# Patient Record
Sex: FEMALE | Race: WHITE | NOT HISPANIC OR LATINO | Employment: UNEMPLOYED | URBAN - METROPOLITAN AREA
[De-identification: names, ages, dates, MRNs, and addresses within clinical notes are randomized per-mention and may not be internally consistent; named-entity substitution may affect disease eponyms.]

---

## 2018-02-21 ENCOUNTER — OFFICE VISIT (OUTPATIENT)
Dept: OPTOMETRY | Facility: CLINIC | Age: 28
End: 2018-02-21
Payer: OTHER GOVERNMENT

## 2018-02-21 DIAGNOSIS — H53.2 DIPLOPIA: ICD-10-CM

## 2018-02-21 DIAGNOSIS — Z01.00 ROUTINE EYE EXAM: Primary | ICD-10-CM

## 2018-02-21 DIAGNOSIS — H52.13 MYOPIA OF BOTH EYES: ICD-10-CM

## 2018-02-21 PROCEDURE — 92004 COMPRE OPH EXAM NEW PT 1/>: CPT | Mod: S$PBB,,, | Performed by: OPTOMETRIST

## 2018-02-21 PROCEDURE — 99212 OFFICE O/P EST SF 10 MIN: CPT | Mod: PBBFAC | Performed by: OPTOMETRIST

## 2018-02-21 PROCEDURE — 99999 PR PBB SHADOW E&M-EST. PATIENT-LVL II: CPT | Mod: PBBFAC,,, | Performed by: OPTOMETRIST

## 2018-02-21 NOTE — PROGRESS NOTES
"HPI     Patient's last dilated exam was: 2012    Pt here for routine eye exam. C/o blurred vision when driving at night.   Has to hold her phone very close to see her phone. Has been causing her to   have frequent headaches. Floaters OU seeing them more frequently in the   last 6 months. She will see an increase in numbers and followed by a   migraine. Feels her "eyes are not focusing together, they're working too   hard". Not using any gtts.    Diplopia: horizontal, random but once it starts during the day it happens   several times, started to become more frequent and a problem 5 months ago,   closing one eye makes it go away, Distance>Near, FHx of strabismus mother  Always had headaches but has increased in frequency    Last edited by Magy Wood, OD on 2/21/2018  2:09 PM. (History)            Assessment /Plan     For exam results, see Encounter Report.    Routine eye exam    Myopia of both eyes    Diplopia        1-3.  Patient states horizontal diplopia started about 5 months ago.  Is random but once is starts will happen several times during the day.  Does resolve when closing one eye.  Distance>Near.  No rx given today.  Overall eye health normal.  Refer to Dr. Mcnulty for binocular vision evaluation.                   "

## 2018-03-02 ENCOUNTER — INITIAL CONSULT (OUTPATIENT)
Dept: OPTOMETRY | Facility: CLINIC | Age: 28
End: 2018-03-02
Payer: OTHER GOVERNMENT

## 2018-03-02 DIAGNOSIS — H50.30 ESOTROPIA, INTERMITTENT: Primary | ICD-10-CM

## 2018-03-02 DIAGNOSIS — H50.21 HYPERTROPIA OF RIGHT EYE: ICD-10-CM

## 2018-03-02 DIAGNOSIS — H52.13 MYOPIA OF BOTH EYES: ICD-10-CM

## 2018-03-02 PROCEDURE — 99999 PR PBB SHADOW E&M-EST. PATIENT-LVL II: CPT | Mod: PBBFAC,,, | Performed by: OPTOMETRIST

## 2018-03-02 PROCEDURE — 99212 OFFICE O/P EST SF 10 MIN: CPT | Mod: PBBFAC | Performed by: OPTOMETRIST

## 2018-03-02 PROCEDURE — 92310 CONTACT LENS FITTING OU: CPT | Mod: ,,, | Performed by: OPTOMETRIST

## 2018-03-02 NOTE — PATIENT INSTRUCTIONS
Strabismus (Crossed Eyes)    Crossed eyes, or strabismus as it is medically termed, is a condition in which both eyes do not look at the same place at the same time. It occurs when an eye turns in, out, up or down and is usually caused by poor eye muscle control or a high amount of farsightedness.  There are six muscles attached to each eye that control how it moves. The muscles receive signals from the brain that direct their movements. Normally, the eyes work together so they both point at the same place. When problems develop with eye movement control, an eye may turn in, out, up or down. The eye turning may be evident all the time or may appear only at certain times such as when the person is tired, ill, or has done a lot of reading or close work. In some cases, the same eye may turn each time, while in other cases, the eyes may alternate turning.  Maintaining proper eye alignment is important to avoid seeing double, for good depth perception, and to prevent the development of poor vision in the turned eye. When the eyes are misaligned, the brain receives two different images. At first, this may create double vision and confusion, but over time the brain will learn to ignore the image from the turned eye. If the eye turning becomes constant and is not treated, it can lead to permanent reduction of vision in one eye, a condition called amblyopia or lazy eye.  Some babies eyes may appear to be misaligned, but are actually both aiming at the same object. This is a condition called pseudostrabismus or false strabismus. The appearance of crossed eyes may be due to extra skin that covers the inner corner of the eyes, or a wide bridge of the nose. Usually, this will change as the childs face begins to grow.   Strabismus usually develops in infants and young children, most often by age 3, but older children and adults can also develop the condition. There is a common misconception that a child with strabismus will  outgrow the condition. However, this is not true. In fact, strabismus may get worse without treatment. Any child older than four months whose eyes do not appear to be straight all the time should be examined.  Strabismus is classified by the direction the eye turns:  Inward turning is called esotropia   Outward turning is called exotropia   Upward turning is called hypertropia   Downward turning is called hypotropia.   Other classifications of strabismus include:  The frequency with which it occurs - either constant or intermittent   Whether it always involves the same eye - unilateral   If the turning eye is sometimes the right eye and other times the left eye - alternating.  Treatment for strabismus may include eyeglasses, prisms, vision therapy, or eye muscle surgery. If detected and treated early, strabismus can often be corrected with excellent results.                    Strabismus can be caused by problems with the eye muscles, the nerves that transmit information to the muscles, or the control center in the brain that directs eye movements. It can also develop due to other general health conditions or eye injuries.  Risk factors for developing strabismus include:  Family history - individuals with parents or siblings who have strabismus are more likely to develop it.   Refractive error - people who have a significant amount of uncorrected farsightedness (hyperopia) may develop strabismus because of the additional amount of eye focusing required to keep objects clear.   Medical conditions - people with conditions such as Down syndrome and cerebral palsy or who have suffered a stroke or head injury are at a higher risk for developing strabismus.  Although there are many types of strabismus that can develop in children or adults, the two most common forms are accommodative esotropia and intermittent exotropia.  Accommodative esotropia often occurs because of uncorrected farsightedness (hyperopia). Because the  eyes focusing system is linked to the system that controls where the eyes point, the extra focusing effort needed to keep images clear in farsightedness may cause the eyes to turn inward. Signs and symptoms of accommodative esotropia may include seeing double, closing or covering one eye when doing close work, and tilting or turning of the head.   Intermittent exotropia may develop due to an inability to coordinate both eyes together. The eyes may have a tendency to point beyond the object being viewed. People with intermittent exotropia may experience headaches, difficulty reading, and eye strain. They also may have a tendency to close one eye when viewing at distance or in bright sunlight.       How is strabismus treated?  People with strabismus have several treatment options available to improve eye alignment and coordination. They include:   eyeglasses or contact lenses   prism lenses   vision therapy   eye muscle surgery  Eyeglasses or contact lenses may be prescribed for patients with uncorrected farsightedness. This may be the only treatment needed for some patients with accommodative esotropia. Once the farsightedness is corrected, the eyes require less focusing effort and may remain straight.  Prism lenses are special lenses that have a prescription for prism power in them. The prisms alter the light entering the eye and assist in reducing the amount of turning the eye has to do to look at objects. Sometimes the prisms are able to fully compensate for and eliminate the eye turning.  Vision therapy is a structured program of visual activities prescribed to improve eye coordination and eye focusing abilities. Vision therapy trains the eyes and brain to work together more effectively. These eye exercises help remediate deficiencies in eye movement, eye focusing and eye teaming and reinforce the eye-brain connection. Treatment may include office-based as well as home training procedures.  Eye muscle surgery  can change the length or position of the muscles around the eye in an attempt to better align the eyes. Eye muscle surgery may be able to physically align the eyes so they appear straight. Often a program of vision therapy may also be needed to develop a functional improvement in eye coordination and to keep the eyes from reverting back to their previous condition of misalignment.    Courtesy of [employer]    School-aged Vision:     A child needs many abilities to succeed in school. Good vision is a key. It has been estimated that as much as 80% of the learning a child does occurs through his or her eyes. Reading, writing, chalkboard work, and using computers are among the visual tasks students perform daily. A child's eyes are constantly in use in the classroom and at play. When his or her vision is not functioning properly, education and participation in sports can suffer.      As children progress in school, they face increasing demands on their visual abilities.   The school years are a very important time in every child's life. All parents want to see their children do well in school and most parents do all they can to provide them with the best educational opportunities. But too often one important learning tool may be overlooked - a child's vision.  As children progress in school, they face increasing demands on their visual abilities. The size of print in schoolbooks becomes smaller and the amount of time spent reading and studying increases significantly. Increased class work and homework place significant demands on the child's eyes. Unfortunately, the visual abilities of some students aren't performing up to the task.  When certain visual skills have not developed, or are poorly developed, learning is difficult and stressful, and children will typically:  Avoid reading and other near visual work as much as possible.   Attempt to do the work anyway, but with a lowered level of  "comprehension or efficiency.   Experience discomfort, fatigue and a short attention span.  Some children with learning difficulties exhibit specific behaviors of hyperactivity and distractibility. These children are often labeled as having "Attention Deficit Hyperactivity Disorder" (ADHD). However, undetected and untreated vision problems can elicit some of the very same signs and symptoms commonly attributed to ADHD. Due to these similarities, some children may be mislabeled as having ADHD when, in fact, they have an undetected vision problem.  Because vision may change frequently during the school years, regular eye and vision care is important. The most common vision problem is nearsightedness or myopia. However, some children have other forms of refractive error like farsightedness and astigmatism. In addition, the existence of eye focusing, eye tracking and eye coordination problems may affect school and sports performance.  Eyeglasses or contact lenses may provide the needed correction for many vision problems. However, a program of vision therapy may also be needed to help develop or enhance vision skills.    Vision Skills Needed For School Success      There are many visual skills beyond seeing clearly that team together to support academic success.   Vision is more than just the ability to see clearly, or having 20/20 eyesight. It is also the ability to understand and respond to what is seen. Basic visual skills include the ability to focus the eyes, use both eyes together as a team, and move them effectively. Other visual perceptual skills include:  recognition (the ability to tell the difference between letters like "b" and "d"),   comprehension (to "picture" in our mind what is happening in a story we are reading), and   retention (to be able to remember and recall details of what we read).  Every child needs to have the following vision skills for effective reading and learning:  Visual acuity -- the " ability to see clearly in the distance for viewing the chalkboard, at an intermediate distance for the computer, and up close for reading a book.    Eye Focusing -- the ability to quickly and accurately maintain clear vision as the distance from objects change, such as when looking from the chalkboard to a paper on the desk and back. Eye focusing allows the child to easily maintain clear vision over time like when reading a book or writing a report.    Eye tracking -- the ability to keep the eyes on target when looking from one object to another, moving the eyes along a printed page, or following a moving object like a thrown ball.    Eye teaming -- the ability to coordinate and use both eyes together when moving the eyes along a printed page, and to be able to  distances and see depth for class work and sports.    Eye-hand coordination -- the ability to use visual information to monitor and direct the hands when drawing a picture or trying to hit a ball.    Visual perception -- the ability to organize images on a printed page into letters, words and ideas and to understand and remember what is read.  If any of these visual skills are lacking or not functioning properly, a child will have to work harder. This can lead to headaches, fatigue and other eyestrain problems. Parents and teachers need to be alert for symptoms that may indicate a child has a vision problem.      Signs of Eye and Vision Problems  A child may not tell you that he or she has a vision problem because they may think the way they see is the way everyone sees.  Signs that may indicate a child has vision problem include:  Frequent eye rubbing or blinking   Short attention span   Avoiding reading and other close activities   Frequent headaches   Covering one eye   Tilting the head to one side   Holding reading materials close to the face   An eye turning in or out   Seeing double   Losing place when reading   Difficulty remembering what he or  she reads    When is a Vision Exam Needed?      Your child should receive an eye examination at least once every two years-more frequently if specific problems or risk factors exist, or if recommended by your eye doctor.   Unfortunately, parents and educators often incorrectly assume that if a child passes a school screening, then there is no vision problem. However, many school vision screenings only test for distance visual acuity. A child who can see 20/20 can still have a vision problem. In reality, the vision skills needed for successful reading and learning are much more complex.  Even if a child passes a vision screening, they should receive a comprehensive optometric examination if:  They show any of the signs or symptoms of a vision problem listed above.   They are not achieving up to their potential.   They are minimally able to achieve, but have to use excessive time and effort to do so.  Vision changes can occur without your child or you noticing them. Therefore, your child should receive an eye examination at least once every two years-more frequently if specific problems or risk factors exist, or if recommended by your eye doctor. The earlier a vision problem is detected and treated, the more likely treatment will be successful. When needed, the doctor can prescribe treatment including eyeglasses, contact lenses or vision therapy to correct any vision problems.      Sports Vision and Eye Protection  Outdoor games and sports are an enjoyable and important part of most children's lives. Whether playing catch in the back yard or participating in team sports at school, vision plays an important role in how well a child performs.  Specific visual skills needed for sports include:  Clear distance vision   Good depth perception   Wide field of vision   Effective eye-hand coordination  A child who consistently underperforms a certain skill in a sport, such as always hitting the front of the rim in basketball  or swinging late at a pitched ball in baseball, may have a vision problem. If visual skills are not adequate, the child may continue to perform poorly. Correction of vision problems with eyeglasses or contact lenses, or a program of eye exercises called vision therapy can correct many vision problems, enhance vision skills, and improve sports vision performance. (Link to Sports Vision)  Eye protection should also be a major concern to all student athletes, especially in certain high-risk sports. Thousands of children suffer sports-related eye injuries each year and nearly all can be prevented by using the proper protective eyewear. That is why it is essential that all children wear appropriate, protective eyewear whenever playing sports. Eye protection should also be worn for other risky activities such as lawn mowing and trimming.  Regular prescription eyeglasses or contact lenses are not a substitute for appropriate, well-fitted protective eyewear. Athletes need to use sports eyewear that is tailored to protect the eyes while playing the specific sport. Your doctor of optometry can recommend specific sports eyewear to provide the level of protection needed.   It is also important for all children to protect their eyes from damage caused by ultraviolet radiation in sunlight. Sunglasses are needed to protect the eyes outdoors and some sport-specific designs may even help improve sports performance.      Learning-Related Vision Problems    By Malcolm Alejo, with updates and review by Ramakrishna Andrea, OD    Vision and learning are intimately related. In fact, experts say that roughly 80 percent of what a child learns in school is information that is presented visually. So good vision is essential for students of all ages to reach their full academic potential.  When children have difficulty in school -- from learning to read to understanding fractions to seeing the blackboard -- many parents and teachers believe these kids  "have vision problems.  And sometimes, they're right. Eyeglasses or contact lenses often help children better see the board in the front of the classroom and the books on their desk.  Ruling out simple refractive errors is the first step in making sure your child is visually ready for school. But nearsightedness, farsightedness and astigmatism are not the only visual disorders that can make learning more difficult.  Less obvious vision problems related to the way the eyes function and how the brain processes visual information also can limit your child's ability to learn.  Any vision problems that have the potential to affect academic and reading performance are considered learning-related vision problems.    Vision and Learning Disabilities  Learning-related vision problems are not learning disabilities. The [place] Individuals with Disabilities Education Act (IDEA)* defines a specific learning disability as: ". . . a disorder in one or more of the basic psychological processes involved in understanding or in using language, spoken or written, that may manifest itself in an imperfect ability to listen, think, speak, read, write, spell, or do mathematical calculations, including conditions such as perceptual disabilities, brain injury, minimal brain dysfunction, dyslexia, and developmental aphasia."  IDEA also says learning disabilities do not include learning problems that are primarily due to visual, hearing or motor disabilities. Mental retardation and emotional disturbances also are excluded as learning disabilities, along with learning problems related to environmental, cultural or economic disadvantage.  But specific vision problems can contribute to a child's learning problems, whether or not he has been diagnosed as "learning disabled." In other words, a child struggling in school may have a specific learning disability, a learning-related vision problem, or both.  If you are concerned about your child's " performance in school, you need to find out the underlying cause (or causes) of the problem. The best way to do this is through a team approach that may include the child's teachers, the school psychologist, an eye doctor who specializes in children's vision and learning-related vision problems and perhaps other professionals.  Identifying all contributing causes of the learning problem increases the chances that the problem can be successfully treated.    Types of Learning-Related Vision Problems  Vision is a complex process that involves not only the eyes but the brain as well. Specific learning-related vision problems can be classified as one of three types. The first two types primarily affect visual input. The third primarily affects visual processing and integration.    If your child habitually places her head close to her book when reading, she may have a vision problem that can affect her ability to learn.     Eye health and refractive problems. These problems can affect the visual acuity in each eye as measured by an eye chart. Refractive errors include nearsightedness, farsightedness and astigmatism, but also include more subtle optical errors called higher-order aberrations. Eye health problems can cause low vision -- permanently decreased visual acuity that cannot be corrected by conventional eyeglasses, contact lenses or refractive surgery.    Functional vision problems. Functional vision refers to a variety of specific functions of the eye and the neurological control of these functions, such as eye teaming (binocularity), fine eye movements (important for efficient reading), and accommodation (focusing amplitude, accuracy and flexibility). Deficits of functional visual skills can cause blurred or double vision, eye strain and headaches that can affect learning. Convergence insufficiency is a specific type of functional vision problem that affects the ability of the two eyes to stay accurately and  comfortably aligned during reading.    Perceptual vision problems. Visual perception includes understanding what you see, identifying it, judging its importance and relating it to previously stored information in the brain. This means, for example, recognizing words that you have seen previously, and using the eyes and brain to form a mental picture of the words you see.  Most routine eye exams evaluate only the first of these categories of vision problems -- those related to eye health and refractive errors. However, many optometrists who specialize in children's vision problems and vision therapy offer exams to evaluate functional vision problems and perceptual vision problems that may affect learning.  Color blindness, though typically not considered a learning-related vision problem, may cause problems in school for young children with color vision problems if color-matching or identifying specific colors is required in classroom activities. For this reason, all children should have an eye exam that includes a color blind test prior to starting school.    Symptoms of Learning-Related Vision Problems  Symptoms of learning-related vision problems include:  Headaches or eye strain   Blurred vision or double vision   Crossed eyes or eyes that appear to move independently of each other (Read more about strabismus.)   Dislike or avoidance of reading and close work   Short attention span during visual tasks   Turning or tilting the head to use one eye only, or closing or covering one eye   Placing the head very close to the book or desk when reading or writing   Excessive blinking or rubbing the eyes   Losing place while reading, or using a finger as a guide   Slow reading speed or poor reading comprehension   Difficulty remembering what was read   Omitting or repeating words, or confusing similar words   Persistent reversal of words or letters (after second grade)   Difficulty remembering, identifying or reproducing  shapes   Poor eye-hand coordination   Evidence of developmental immaturity    Learning problems can lead to depression and low self-esteem. Seeing an eye doctor should be one of your first steps.   If your child shows one or more of these symptoms and is experiencing learning problems, it's possible he or she may have a learning-related vision problem.  To determine if such a problem exists, see an eye doctor who specializes in children's vision and learning-related vision problems for a comprehensive evaluation.  If no vision problem is detected, it's possible your child's symptoms are caused by a non-visual dysfunction, such as dyslexia or a learning disability. See an  for an evaluation to rule out these problems.  Signs of Attention and Developmental Disorders   Many people know attention disorders by the names attention deficit disorder (ADD) or attention deficit/hyperactivity disorder (ADHD). Frequently such children are put on drugs like Ritalin. Occasionally children with attention disorders experience other problems that contribute to inattentiveness, such as a speech and language dysfunction or nonverbal disorder. Consult a pediatric neurologist for a definitive diagnosis.  Parents can easily identify the three components of the autism spectrum disorder: lack of eye contact, inability to relate socially or inappropriate social interaction, and unusual repetitive interests that exclude other activities. Any or all of these early signs should prompt a consultation with your family doctor or pediatrician.    Treatment of Learning-Related Vision Problems  If your child is diagnosed with a learning-related vision problem, treatment generally consists of an individualized and doctor-supervised program of vision therapy. Special eyeglasses also may be prescribed for either full-time wear or for specific tasks such as reading.  If your child is also receiving special education or other  "special services for a learning disability, ask the eye doctor who is supervising your child's vision therapy to contact your child's teacher and other professionals involved in his or her Individualized Education Program (IEP) or other remedial activities.  In some cases, vision therapy and remedial learning activities can be combined, and a cooperative effort to address your child's learning problems may be the best approach.  Also, keep in mind that children with learning difficulties may experience emotional problems as well, such as anxiety, depression and low self-esteem.  Reassure your child that learning problems and learning-related vision problems say nothing about a person's intelligence. Many children with learning difficulties have above-average IQs and simply process information differently than their peers.       Vision:   2 to 5 Years of Age    Every experience a preschooler has is an opportunity for growth and development. They use their vision to guide other learning experiences. From ages 2 to 5, a child will be fine-tuning the visual abilities gained during infancy and developing new ones.   Stacking building blocks, rolling a ball back and forth, coloring, drawing, cutting, or assembling lock-together toys all help improve important visual skills. Preschoolers depend on their vision to learn tasks that will prepare them for school. They are developing the visually-guided eye-hand-body coordination, fine motor skills and visual perceptual abilities necessary to learn to read and write.      Steps taken at this age to help ensure vision is developing normally can provide a child with a good "head start" for school.   Preschoolers are eager to draw and look at pictures. Also, reading to young children is important to help them develop strong visualization skills as they "picture" the story in their minds.  This is also the time when parents need to be alert for the presence of vision " "problems like crossed eyes or lazy eye. These conditions often develop at this age. Crossed eyes or strabismus involves one or both eyes turning inward or outward. Amblyopia, commonly known as lazy eye, is a lack of clear vision in one eye, which can't be fully corrected with eyeglasses. Lazy eye often develops as a result of crossed eyes, but may occur without noticeable signs.   In addition, parents should watch their child for indication of any delays in development, which may signal the presence of a vision problem. Difficulty with recognition of colors, shapes, letters and numbers can occur if there is a vision problem.  The  years are a time for developing the visual abilities that a child will need in school and throughout his or her life. Steps taken during these years to help ensure vision is developing normally can provide a child with a good "head start" for school.        Signs of Eye and Vision Problems  According to the [employer] Association, about 10% of preschoolers have eye or vision problems. However, children this age generally will not voice complaints about their eyes.   Parents should watch for signs that may indicate a vision problem, including:   Sitting close to the TV or holding a book too close   Squinting   Tilting their head   Frequently rubbing their eyes   Short attention span for the child's age   Turning of an eye in or out   Sensitivity to light   Difficulty with eye-hand-body coordination when playing ball or bike riding   Avoiding coloring activities, puzzles and other detailed activities  If you notice any of these signs in your preschooler, arrange for a visit to your doctor of optometry.      Understanding the Difference Between a Vision Screening and a Vision Examination  It is important to know that a vision screening by a child's pediatrician or at his or her  is not the same as a comprehensive eye and vision examination by an optometrist. " Vision screenings are a limited process and can't be used to diagnose an eye or vision problem, but rather may indicate a potential need for further evaluation. They may miss as many as 60% of children with vision problems. Even if a vision screening does not identify a possible vision problem, a child may still have one.  Passing a vision screening can give parents a false sense of security. Many  vision screenings only assess one or two areas of vision. They may not evaluate how well the child can focus his or her eyes or how well the eyes work together. Generally color vision, which is important to the use of color coded learning materials, is not tested.   By age 3, your child should have a thorough optometric eye examination to make sure his or her vision is developing properly and there is no evidence of eye disease. If needed, your doctor of optometry can prescribe treatment, including eyeglasses and/or vision therapy, to correct a vision development problem.  With today's diagnostic equipment and tests, a child does not have to know the alphabet or how to read to have his or her eyes examined. Here are several tips to make your child's optometric examination a positive experience:  1. Make an appointment early in the day. Allow about one hour.   2. Talk about the examination in advance and encourage your child's questions.   3. Explain the examination in terms your child can understand, comparing the E chart to a puzzle and the instruments to tiny flashlights and a kaleidoscope.  Unless your doctor of optometry advises otherwise, your child's next eye examination should be at age 5. By comparing test results of the two examinations, your optometrist can tell how well your child's vision is developing for the next major step into the school years.      What Parents Can Do to Help with  Vision Development      Playing with other children can help developing visual skills.   There are everyday  things that parents can do at home to help their preschooler's vision develop as it should. There are a lot of ways to use playtime activities to help improve different visual skills.  Toys, games and playtime activities help by stimulating the process of vision development. Sometimes, despite all your efforts, your child may still miss a step in vision development. This is why vision examinations at ages 3 and 5 are important to detect and treat these problems before a child begins school.  Here are several things that can be done at home to help your preschooler continue to successfully develop his or her visual skills:  Practice throwing and catching a ball or bean bag   Read aloud to your child and let him or her see what is being read   Provide a chalkboard or finger paints   Encourage play activities requiring hand-eye coordination such as block building and assembling puzzles   Play simple memory games   Provide opportunities to color, cut and paste   Make time for outdoor play including ball games, bike/tricycle riding, swinging and rolling activities   Encourage interaction with other children.    Courtesy of The [employer]    To better understand risks for vision problems,please visit: www.mykidsvision.org    To minimize eyestrain and Lower the risk of becoming near-sighted:   - Limit use of near electronic devices to no more than 20 minutes at a time, no more than 2 hours a day    - No electronic devices before age 2    -Avoid watching screens (TV, devices, etc.)  in complete darkness    - Spend 1-3 hours outdoors daily so that the eyes are exposed to natural light        ADULT VISION  19 to 40 Years of Age    Most adults, aged 19 to 40, enjoy healthy eyes and good vision. The most common eye and vision problems experienced by people in this age group are due to visual stress and eye injuries. By taking proper steps to maintain a healthy lifestyle and protect your eyes from stress and  injury, you can avoid many eye and vision problems.  Good vision is important as you pursue a college degree, begin your career, or perhaps start and raise a family. Here are some things you can do to help maintain healthy eyes and good vision:  Eat Healthy -- As part of a healthful diet, eat five servings of fruits and vegetables each day. Choose foods rich in antioxidants like leafy, green vegetables and fish.     Don't Smoke -- Smoking exposes your eyes to high levels of noxious chemicals and increases the risk for developing age-related macular degeneration (AMD) and cataracts.    Get Regular Exercise -- Exercise improves blood circulation, increases oxygen levels to the eyes and aids in the removal of toxins.    Wear Sunglasses -- Protect your eyes from harmful ultraviolet (UV) rays when outdoors. Choose sunglasses with UVA and UVB protection, to block both forms of ultraviolet rays.     Get Periodic Eye Examinations -- Although vision generally remains stable during these years, some problems may develop without any obvious signs or symptoms. The best way to protect your vision is through regularly scheduled professional eye examinations.  The American [employer] Association recommends that adults aged 19 to 40 receive an eye exam at least every two years. If you are at risk for eye problems due to a family history of eye disease, diabetes, high blood pressure or past vision problems, your doctor of optometry may recommend more frequent exams. In between examinations, if you notice a change in your vision, contact your doctor. Detecting and treating problems early can help maintain good vision for the rest of your life.    Dealing with Visual Stress at School or on the Job  Eyestrain is a common occurrence in today's visually demanding world. A typical college schedule or office workday involves spending long hours reading, working at a desk, or staring at a computer. A poorly designed study or work  environment, with elements such as improper lighting, uncomfortable seating, incorrect viewing angles and improper reading or working distances can add to the visual stress. As the day progresses, the eyes begin to fatigue and eyestrain and discomfort can develop.      A poorly designed study or work environment, with elements such as improper lighting, uncomfortable seating, incorrect viewing angles and improper reading or working distances can add to the visual stress.   The following are several key signs and symptoms of eyestrain:  Sore or tired eyes   Itching or burning sensations in the eyes   Sensitivity to light   Dry or watery eyes   Headaches   Difficulty focusing  Here are some simple steps you can take to minimize eyestrain, particularly during computer work:  Workplace Adjustments  Position the top of your computer monitor below eye level so you look slightly downward when viewing the screen. This will help minimize strain on the eyes and the neck. If you are typing from copy, position the text at the same level as the screen. Adjust the screen brightness so it is most comfortable for you. Avoid glare on the computer screen by adjusting window curtains or blinds, repositioning the monitor, or using a glare reduction filter.    Proper Lighting  Examine the lighting in your work area. Overhead lights can be harsh and often are brighter than necessary. Consider turning some of the lights off for a more comfortable lighting situation. Use an adjustable shaded lamp to provide specific task lighting as needed.    Rest Breaks  Throughout the day, give your eyes a chance to rest. Take several minutes every hour to look away from the computer and allow your eyes to re-adjust. Consider standing up and walking around or doing alternate tasks that do not require extensive near focusing. Blink often to refresh the eyes and use artificial tear solutions, if necessary.    Posture  When seated at a desk, make sure your  feet are flat on the floor. Use a chair that is adjustable and provides adequate support for your back. When working at a computer, your arms should form a 90 degree angle at the elbows and your hands should be tilted up slightly to allow your fingers to travel freely over the keyboard.  Making these simple adjustments to your study or work area can pay big dividends in terms of preventing or reducing eyestrain. If you continue to experience eye-related symptoms, you may have a vision problem requiring treatment. Ask your optometrist.      Ensuring Eye Safety at Work, Home or Play  The National [employer] for Occup[employer] Safety and Health reports about 2,000 [place] workers sustain job-related eye injuries that require medical treatment each day. But more injuries to the eye actually result from use or misuse of products at home rather than on the job. Nearly 60 percent of all product-related eye injuries occur in and around the home, according to [employer].  Any injury to the eye has the potential for causing some vision loss or even blindness. Fortunately, most eye injuries can be prevented with the use of proper eye protection. Prevention involves being aware of the common causes of injury and knowing how to protect your eyes-at home, at work and at play.  At Work      Proper eye protection can help to lessen or prevent serious eye injuries.   Eye injuries occurring at work, whether in a factory, on a construction site, on a farm, or in a laboratory, can result from chemical burns, foreign objects in the eye and cuts and scrapes of the cornea. Common causes of injuries include  splashes with chemicals, grease and oil   burns from steam   ultraviolet or infrared radiation exposure; and flying wood or metal chips  Not all forms of safety eyewear provide the same level of protection from flying objects, chemical splashes or radiation exposure. Be sure to wear the appropriate protection for the type of  eye hazards in your workplace.              At Home  Using common sense can help protect the eyes at home. Following 's instructions and safety warnings will help prevent many household product-related eye injuries.  Wearing eye protection while performing certain household activities can prevent eye injuries. Some activities include:  Cleaning the oven or using other strong household chemicals   Chopping wood or doing woodworking   Using motorized equipment or power tools like lawn trimmers and electric drills   Jump-starting a car battery  Non-prescription safety goggles are sold at many home building stores and hardware stores. If you wear prescription glasses, ask your optometrist to make a recommendation on appropriate safety eyewear for household tasks.  At Play  Sprained ankles, skinned knees, and bruises are common occurrences when participating in sports. Unfortunately, so are injuries to the eye.  Regular eyeglasses and contact lenses do not offer adequate protection from sports-related eye injuries. Special eye protection is needed for basketball, football, hockey, baseball and racket sports. Choose the right goggles or protective eyewear for your sport. Your optometrist can advise you on the appropriate eye protection.      Protecting Your Eyes from the Sun  Even on an overcast day, harmful ultraviolet (UV) rays can damage both the skin and the surface of the eye. Over time, unprotected exposure to the sun can increase the risk of certain types of cataracts and cancers of the eyelids. UV, as well as blue light, has the potential to damage the retina, the light-sensitive lining at the back of the eye, which could lead to significant loss of vision. UV damage is cumulative, so it's never too late to begin protecting your eyes from the sun's harmful rays.  The following tips can help prevent eye damage from exposure to UV radiation:  Wear a wide-brimmed hat or cap. It can block up to half of the  UV radiation, reducing the amount that may enter from above or around sunglasses.   Wear sunglasses any time your eyes are exposed to UV radiation, even on cloudy days and during winter months.   Look for quality sunglasses that offer good protection. Sunglasses should block out 99 to 100 percent of both UVA and UBB radiation and screen out 75 to 90 percent of visible light.    Courtesy of The [employer] Association

## 2018-03-02 NOTE — PROGRESS NOTES
HPI     Giselle Naranjo is a 27 y.o. Female who referred by Dr Wood for vertical   diplopia. Onset was 6 months ago when she first noticed that she has   double vision. This occurs mostly when looking in the distance and is   persistent for up to 25 minutes. Mother had strabismus. Corrective surgery    left with alternating vision. Giselle wore  bifocal lenses as a child to   help switching focus from near to distance vision. She gets headaches   about 5 -7 days a week.Today she would like to find out if her headaches   and the double vision could be related.    (+)blurred vision  (+)Headaches  (+)diplopia  (+)flashes  (--)floaters  (--)pain  (--)Itching  (--)tearing  (--)burning  (--)Dryness  (--) OTC Drops  (--)Photophobia    Last edited by Lenore Mcnulty, OD on 3/2/2018  2:24 PM. (History)        ROS    Assessment /Plan     For exam results, see Encounter Report.    1. 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 Myopia of both eyes  - Spec Rx per final Rx below  Glasses Prescription (3/2/2018)        Sphere Cylinder Add Horz Prism Vert Prism    Right -0.50 Sphere +1.00 1.0 out     Left -0.50 Sphere +1.00 1.0 out 0.5 up    Type:  PAL    Expiration Date:  3/3/2019        3. Accommodative infacility   - Low PAL      Patient education; RTC prn

## 2018-03-02 NOTE — LETTER
March 2, 2018      Magy Wood, OD  [address]  [place] [address]           [facility]  [address]  [place] [address]  Phone: [phone]          Patient: Giselle Naranjo   MR Number: [id number]   YOB: 1990   Date of Visit: 3/2/2018       Dear Dr. Magy Wood:    Thank you for referring Giselle Naranjo to me for evaluation. Attached you will find relevant portions of my assessment and plan of care.    If you have questions, please do not hesitate to call me. I look forward to following Giselle Naranjo along with you.    Sincerely,    Lenore Mcnulty, OD    Enclosure  CC:  No Recipients    If you would like to receive this communication electronically, please contact [email or web address] or [phone] to request more information on [employer] access.    For providers and/or their staff who would like to refer a patient to [facility], please contact us through our one-stop-shop provider referral line, [facility] , at [phone].    If you feel you have received this communication in error or would no longer like to receive these types of communications, please e-mail [email or web address]